# Patient Record
Sex: MALE | Race: WHITE | NOT HISPANIC OR LATINO | URBAN - METROPOLITAN AREA
[De-identification: names, ages, dates, MRNs, and addresses within clinical notes are randomized per-mention and may not be internally consistent; named-entity substitution may affect disease eponyms.]

---

## 2022-06-10 ENCOUNTER — EMERGENCY (EMERGENCY)
Facility: HOSPITAL | Age: 22
LOS: 1 days | Discharge: HOME | End: 2022-06-10
Attending: EMERGENCY MEDICINE
Payer: COMMERCIAL

## 2022-06-10 VITALS
RESPIRATION RATE: 18 BRPM | HEART RATE: 92 BPM | DIASTOLIC BLOOD PRESSURE: 77 MMHG | TEMPERATURE: 99 F | HEIGHT: 72 IN | OXYGEN SATURATION: 98 % | SYSTOLIC BLOOD PRESSURE: 126 MMHG | WEIGHT: 175.93 LBS

## 2022-06-10 PROCEDURE — 90471 IMMUNIZATION ADMIN: CPT

## 2022-06-10 PROCEDURE — 99283 EMERGENCY DEPT VISIT LOW MDM: CPT | Mod: 25

## 2022-06-10 PROCEDURE — 12011 RPR F/E/E/N/L/M 2.5 CM/<: CPT

## 2022-06-10 PROCEDURE — 90715 TDAP VACCINE 7 YRS/> IM: CPT

## 2022-06-10 RX ORDER — TETANUS TOXOID, REDUCED DIPHTHERIA TOXOID AND ACELLULAR PERTUSSIS VACCINE, ADSORBED 5; 2.5; 8; 8; 2.5 [IU]/.5ML; [IU]/.5ML; UG/.5ML; UG/.5ML; UG/.5ML
0.5 SUSPENSION INTRAMUSCULAR ONCE
Refills: 0 | Status: DISCONTINUED | OUTPATIENT
Start: 2022-06-10 | End: 2022-06-10

## 2022-06-10 RX ORDER — TETANUS TOXOID, REDUCED DIPHTHERIA TOXOID AND ACELLULAR PERTUSSIS VACCINE, ADSORBED 5; 2.5; 8; 8; 2.5 [IU]/.5ML; [IU]/.5ML; UG/.5ML; UG/.5ML; UG/.5ML
0.5 SUSPENSION INTRAMUSCULAR ONCE
Refills: 0 | Status: COMPLETED | OUTPATIENT
Start: 2022-06-10 | End: 2022-06-10

## 2022-06-10 RX ORDER — IBUPROFEN 200 MG
400 TABLET ORAL ONCE
Refills: 0 | Status: COMPLETED | OUTPATIENT
Start: 2022-06-10 | End: 2022-06-10

## 2022-06-10 RX ADMIN — Medication 400 MILLIGRAM(S): at 22:19

## 2022-06-10 RX ADMIN — TETANUS TOXOID, REDUCED DIPHTHERIA TOXOID AND ACELLULAR PERTUSSIS VACCINE, ADSORBED 0.5 MILLILITER(S): 5; 2.5; 8; 8; 2.5 SUSPENSION INTRAMUSCULAR at 22:19

## 2022-06-10 NOTE — ED PROVIDER NOTE - ATTENDING APP SHARED VISIT CONTRIBUTION OF CARE
Attending note (Aidan): 22y/o M with no reported medical comorbidities, c/o laceration to the left eyebrow sustained during headbutt while in martial arts class; did not fall over, has no HA, no vision changes, no n/v; no LOC; no double vision with eye movement.  On exam, has 2 cm laceration just inferior to the outer portion of the left eyebrow, slight ecchymoses, EOMI (w/o any pain or diplopia elicited on exam), no orbital tenderness or step off. Will offer tetanus vaccination, ibuprofen, and repair laceration after cleaning. Then stable for dc with wound care instructions and return instructions for suture removal.

## 2022-06-10 NOTE — ED PROVIDER NOTE - PATIENT PORTAL LINK FT
You can access the FollowMyHealth Patient Portal offered by [facility] by registering at the following website: [email or web address]. By joining [employer] [facility]s FollowMyHealth portal, you will also be able to view your health information using other applications (apps) compatible with our system.

## 2022-06-10 NOTE — ED PROVIDER NOTE - NSFOLLOWUPINSTRUCTIONS_ED_ALL_ED_FT

## 2022-06-10 NOTE — ED PROVIDER NOTE - PHYSICAL EXAMINATION
On Physical Exam:  General: well appearing, in NAD, speaking clearly in full sentences and without difficulty; cooperative with exam  HEENT: PERRL, MMM; 2 cm laceration just inferior to the outer portion of the left eyebrow, slight ecchymoses, EOMI (w/o any pain or diplopia elicited on exam), no orbital tenderness or step off.  Neck: no neck tenderness, no nuchal rigidity  Skin: L eyebrow laceration, 2cm, linear/horizontal, superficial, no visible bone  Extremities: no peripheral edema, no gross deformities  Neuro: GCS 15

## 2022-06-10 NOTE — ED PROVIDER NOTE - OBJECTIVE STATEMENT
21M presents to the ED with laceration under left eyebrow s/p sport-related trauma today. Patient was sparring with someone in martial arts when he received a headbutt to the laceration region. Denies blurry vision, double vision, LOC, neck pain. Tdap status is not UTD. 21M with no pertinent PMHx/PSHx presents to the ED with laceration under left eyebrow s/p sport-related trauma today. Patient was sparring with someone in martial arts when he received a headbutt to the laceration region. Denies blurry vision, double vision, LOC, neck pain. Tdap status is not UTD. 21M with no pertinent PMHx/PSHx presents to the ED with laceration under left eyebrow s/p sport-related trauma today. Patient was sparring with someone in martial arts when he received a headbutt to the left eyebrow region. Denies blurry vision, double vision, LOC, neck pain. Tdap status is not UTD.

## 2022-06-10 NOTE — ED ADULT NURSE NOTE - OBJECTIVE STATEMENT
20 y/o male coming to the ER with c/o laceration. A&Ox4. Ambulatory. No PMH. Patient was sparring with someone in martial arts when during a headbutt he suffered a laceration above the left eyebrow. No active bleeding. Negative LOC. No anticoagulants. Unknown last tetanus.

## 2022-06-17 ENCOUNTER — EMERGENCY (EMERGENCY)
Facility: HOSPITAL | Age: 22
LOS: 1 days | Discharge: HOME | End: 2022-06-17
Attending: EMERGENCY MEDICINE
Payer: COMMERCIAL

## 2022-06-17 VITALS
TEMPERATURE: 98 F | DIASTOLIC BLOOD PRESSURE: 79 MMHG | RESPIRATION RATE: 18 BRPM | SYSTOLIC BLOOD PRESSURE: 118 MMHG | HEART RATE: 95 BPM | HEIGHT: 72 IN | WEIGHT: 175.93 LBS

## 2022-06-17 PROCEDURE — G0463: CPT

## 2022-06-17 PROCEDURE — L9995: CPT

## 2022-06-17 NOTE — ED PROVIDER NOTE - PHYSICAL EXAMINATION
GENERAL: Awake, alert, NAD  HEENT: NC/AT, moist mucous membranes, PERRL, EOMI. 3cm laceration in L eyebrow, 6 sutures in place, well healed, no erythema, blood or pus drainage   CARDIAC: RRR, no m/r/g  ABDOMEN: Soft, normal BS, non tender, non distended, no rebound, no guarding  BACK: No midline spinal tenderness, no CVA tenderness  EXT: No edema, no calf tenderness, 2+ DP pulses bilaterally, no deformities.  NEURO: A&Ox3. Moving all extremities.  SKIN: Warm and dry. No rash.  PSYCH: Normal affect.

## 2022-06-17 NOTE — ED PROVIDER NOTE - OBJECTIVE STATEMENT
21 year old M no PMH presenting for suture removal. Was sparring in martial arts, got headbutted. 6 sutures in L eyebrow placed 6/10/22. Denies HA, vision changes, fevers, blood or pus drainage, vomiting, pain with EOM.

## 2022-06-17 NOTE — ED ADULT NURSE NOTE - OBJECTIVE STATEMENT
20 yo m arrived to the ed for suture removal; sutures to l eyebrow; reports initial injury from being "headbutted" during martial arts class; denies any other injuries; denies fevers/chills/drainage

## 2022-06-17 NOTE — ED PROVIDER NOTE - CLINICAL SUMMARY MEDICAL DECISION MAKING FREE TEXT BOX
3cm L eyebrow laceration with 6 sutures. Well healed, well approximated, no erythema, blood or pus. No signs or symptoms of systemic infection. Removed without issue. Instructed to apply bacitracin and sunscreen.

## 2022-06-17 NOTE — ED ADULT NURSE NOTE - NSIMPLEMENTINTERV_GEN_ALL_ED
Implemented All Universal Safety Interventions:  [place] to call system. Call bell, personal items and telephone within reach. Instruct patient to call for assistance. Room bathroom lighting operational. Non-slip footwear when patient is off stretcher. Physically safe environment: no spills, clutter or unnecessary equipment. Stretcher in lowest position, wheels locked, appropriate side rails in place.

## 2022-06-17 NOTE — ED PROVIDER NOTE - NSFOLLOWUPINSTRUCTIONS_ED_ALL_ED_FT
6 sutures were removed from your eyebrow.     Continue to apply bacitracin. Use sunscreen over the area to reduce scarring.     Return with new or worsening symptoms, including:   -Severe headache  -Vision loss or vision changes  -Increased pain  -Blood or pus drainage from the area

## 2022-06-17 NOTE — ED PROVIDER NOTE - ATTENDING CONTRIBUTION TO CARE
Attending MD Santos: I personally have seen and examined this patient.  Resident note reviewed and agree on plan of care and except where noted.  See below for details.     seen in [place] [facility]    21M with no reported contributory PMH/PSH/Meds, no known drug allergies presents to the ED for suture removal from L eyebrow.  Reports was head butted during martial arts on 6/10/22.  Denies change in vision, double vision, sudden loss of vision, severe headache, purulence or bleeding from wound.      Exam:   General: NAD  HENT: head NCAT, airway patent   Chest: symmetric chest rise, no increased work of breathing  MSK: ranging neck freely  Neuro: moving all extremities spontaneously, sensory grossly intact, no gross neuro deficits  Psych: normal mood and affect   Skin: L lateral eyebrow laceration well approximated with 6 sutures in place, area C/D/I with no surrounding erythema, no fluctuance    A/P: 21M here for suture removal, will remove

## 2022-06-17 NOTE — ED PROVIDER NOTE - PATIENT PORTAL LINK FT
You can access the FollowMyHealth Patient Portal offered by [facility] by registering at the following website: [email or web address]. By joining [employer]’s FollowMyHealth portal, you will also be able to view your health information using other applications (apps) compatible with our system.

## 2022-06-18 PROBLEM — Z78.9 OTHER SPECIFIED HEALTH STATUS: Chronic | Status: ACTIVE | Noted: 2022-06-10
